# Patient Record
Sex: MALE | Race: ASIAN | NOT HISPANIC OR LATINO | URBAN - METROPOLITAN AREA
[De-identification: names, ages, dates, MRNs, and addresses within clinical notes are randomized per-mention and may not be internally consistent; named-entity substitution may affect disease eponyms.]

---

## 2018-03-24 ENCOUNTER — EMERGENCY (EMERGENCY)
Facility: HOSPITAL | Age: 28
LOS: 1 days | Discharge: HOME | End: 2018-03-24
Attending: EMERGENCY MEDICINE | Admitting: EMERGENCY MEDICINE
Payer: SELF-PAY

## 2018-03-24 VITALS
RESPIRATION RATE: 16 BRPM | TEMPERATURE: 98 F | OXYGEN SATURATION: 100 % | DIASTOLIC BLOOD PRESSURE: 67 MMHG | HEART RATE: 83 BPM | SYSTOLIC BLOOD PRESSURE: 129 MMHG

## 2018-03-24 VITALS
HEART RATE: 80 BPM | OXYGEN SATURATION: 100 % | SYSTOLIC BLOOD PRESSURE: 133 MMHG | RESPIRATION RATE: 16 BRPM | TEMPERATURE: 99 F | DIASTOLIC BLOOD PRESSURE: 73 MMHG

## 2018-03-24 PROCEDURE — 99284 EMERGENCY DEPT VISIT MOD MDM: CPT | Mod: 25

## 2018-03-24 RX ORDER — DEXAMETHASONE 0.5 MG/5ML
10 ELIXIR ORAL ONCE
Qty: 0 | Refills: 0 | Status: DISCONTINUED | OUTPATIENT
Start: 2018-03-24 | End: 2018-03-24

## 2018-03-24 RX ORDER — IBUPROFEN 200 MG
1 TABLET ORAL
Qty: 20 | Refills: 0 | OUTPATIENT
Start: 2018-03-24 | End: 2018-03-28

## 2018-03-24 RX ORDER — KETOROLAC TROMETHAMINE 30 MG/ML
30 SYRINGE (ML) INJECTION ONCE
Qty: 0 | Refills: 0 | Status: DISCONTINUED | OUTPATIENT
Start: 2018-03-24 | End: 2018-03-24

## 2018-03-24 RX ORDER — DEXAMETHASONE 0.5 MG/5ML
6 ELIXIR ORAL ONCE
Qty: 0 | Refills: 0 | Status: COMPLETED | OUTPATIENT
Start: 2018-03-24 | End: 2018-03-24

## 2018-03-24 RX ADMIN — Medication 6 MILLIGRAM(S): at 05:14

## 2018-03-24 RX ADMIN — Medication 30 MILLIGRAM(S): at 05:14

## 2018-03-24 NOTE — ED ADULT TRIAGE NOTE - CHIEF COMPLAINT QUOTE
Pt walk in c/o Sore throat Dry cough for 3 days. Subjective Fever  Took Tylenol prior coming here. Claimed kids area sick at home with same complaints. Pt walk in c/o Sore throat Dry cough for 3 days.  Tonsils are swollen. Subjective Fever  Took Tylenol prior coming here. Claimed kids area sick at home with same complaints. Pt walk in c/o Sore throat Dry cough for 3 days.  Tonsils are swollen. Subjective Fever  Took Tylenol prior coming here. Claimed kids area sick at home with same complaints. Denies CP SOB Ha dizziness palpitation, N V

## 2018-03-24 NOTE — ED ADULT NURSE NOTE - OBJECTIVE STATEMENT
Pt received A&Ox3 to ED Rm 15 with c/o dry cough, sore throat, chills, subjective fever x 3d. States sick child at home with similar symptoms. States tylenol with mild relief. RR equal & unlabored. Spouse at bedside. Awaiting MD orders.

## 2018-03-24 NOTE — ED ADULT NURSE NOTE - CHIEF COMPLAINT QUOTE
Pt walk in c/o Sore throat Dry cough for 3 days.  Tonsils are swollen. Subjective Fever  Took Tylenol prior coming here. Claimed kids area sick at home with same complaints. Denies CP SOB Ha dizziness palpitation, N V

## 2018-03-24 NOTE — ED PROVIDER NOTE - ATTENDING CONTRIBUTION TO CARE
27 y/o otherwise healthy M with 3 days dry cough and sore throat.  Pt reports subjective fevers at home, but resolved 1 day ago.  No HA, neck stiffness, cp, sob, rash, abd pain, n/v.  (+)sick contacts at home with similar sxs.  Well appearing, lying comfortably in stretcher, awake and alert, nontoxic.  VSS.  Post oropharynx is red, no exudates, uvula is midline.  No voice changes, no stridor or drooling.  Neck is supple, no cervical lad.  Lungs cta bl.  Cards nl S1/S2, RRR, no MRG.  NSAIDs/decadron for symptomatic rx, outpatient follow-up.

## 2018-03-24 NOTE — ED PROVIDER NOTE - OBJECTIVE STATEMENT
28 M no pmh, p/w sore throat and dry cough x 3 days. Took tylenol today with little relief. Pain with swallowing. managing secretions. No SoB. Subjective fever at home. Kids at home with cough and fever.

## 2020-11-30 NOTE — ED ADULT NURSE NOTE - NS ED NURSE RECORD ANOTHER VITAL SIGN
12/2/2020          Cinthya Duff  [address] [place] [address]                    RAE Bailey  [address] [place] [address]  [id number]  Phone:  [phone]                                 Yes, record another set of vital signs

## 2022-04-16 NOTE — ED ADULT TRIAGE NOTE - CCCP TRG CHIEF CMPLNT
Home Oxygen Evaluation    Home Oxygen Evaluation completed. Patient is on room air at rest.    Resting SpO2 on room air = 92%    SpO2 on room air with exercise = 93%  SpO2 on oxygen with exercise = NA    Nocturnal Oximetry with patient on room air is recommended is SpO2 is between 89% and 95% (requires additional order).     SLOANE SUAREZ RCP  12:12 PM cough sore throat/cough